# Patient Record
Sex: FEMALE | Race: WHITE | NOT HISPANIC OR LATINO | Employment: FULL TIME | URBAN - METROPOLITAN AREA
[De-identification: names, ages, dates, MRNs, and addresses within clinical notes are randomized per-mention and may not be internally consistent; named-entity substitution may affect disease eponyms.]

---

## 2017-11-30 ENCOUNTER — OFFICE VISIT (OUTPATIENT)
Dept: INTERNAL MEDICINE | Facility: CLINIC | Age: 22
End: 2017-11-30
Payer: COMMERCIAL

## 2017-11-30 VITALS
TEMPERATURE: 98.9 F | WEIGHT: 190.9 LBS | BODY MASS INDEX: 28.27 KG/M2 | SYSTOLIC BLOOD PRESSURE: 128 MMHG | HEIGHT: 69 IN | HEART RATE: 78 BPM | OXYGEN SATURATION: 98 % | DIASTOLIC BLOOD PRESSURE: 82 MMHG

## 2017-11-30 DIAGNOSIS — R19.5 LOOSE STOOLS: Primary | ICD-10-CM

## 2017-11-30 DIAGNOSIS — R19.7 DIARRHEA, UNSPECIFIED TYPE: ICD-10-CM

## 2017-11-30 LAB
ALBUMIN SERPL-MCNC: 4.1 G/DL (ref 3.4–5)
ALP SERPL-CCNC: 69 U/L (ref 40–150)
ALT SERPL W P-5'-P-CCNC: 21 U/L (ref 0–50)
ANION GAP SERPL CALCULATED.3IONS-SCNC: 4 MMOL/L (ref 3–14)
AST SERPL W P-5'-P-CCNC: 8 U/L (ref 0–45)
BASOPHILS # BLD AUTO: 0 10E9/L (ref 0–0.2)
BASOPHILS NFR BLD AUTO: 0.5 %
BILIRUB SERPL-MCNC: 0.4 MG/DL (ref 0.2–1.3)
BUN SERPL-MCNC: 8 MG/DL (ref 7–30)
CALCIUM SERPL-MCNC: 9.6 MG/DL (ref 8.5–10.1)
CHLORIDE SERPL-SCNC: 105 MMOL/L (ref 94–109)
CO2 SERPL-SCNC: 29 MMOL/L (ref 20–32)
CREAT SERPL-MCNC: 0.66 MG/DL (ref 0.52–1.04)
DIFFERENTIAL METHOD BLD: NORMAL
EOSINOPHIL # BLD AUTO: 0.1 10E9/L (ref 0–0.7)
EOSINOPHIL NFR BLD AUTO: 1.4 %
ERYTHROCYTE [DISTWIDTH] IN BLOOD BY AUTOMATED COUNT: 14.3 % (ref 10–15)
GFR SERPL CREATININE-BSD FRML MDRD: >90 ML/MIN/1.7M2
GLUCOSE SERPL-MCNC: 81 MG/DL (ref 70–99)
HCT VFR BLD AUTO: 39.1 % (ref 35–47)
HGB BLD-MCNC: 12.6 G/DL (ref 11.7–15.7)
LYMPHOCYTES # BLD AUTO: 2 10E9/L (ref 0.8–5.3)
LYMPHOCYTES NFR BLD AUTO: 31.5 %
MCH RBC QN AUTO: 27 PG (ref 26.5–33)
MCHC RBC AUTO-ENTMCNC: 32.2 G/DL (ref 31.5–36.5)
MCV RBC AUTO: 84 FL (ref 78–100)
MONOCYTES # BLD AUTO: 0.6 10E9/L (ref 0–1.3)
MONOCYTES NFR BLD AUTO: 8.8 %
NEUTROPHILS # BLD AUTO: 3.7 10E9/L (ref 1.6–8.3)
NEUTROPHILS NFR BLD AUTO: 57.8 %
PLATELET # BLD AUTO: 265 10E9/L (ref 150–450)
POTASSIUM SERPL-SCNC: 4 MMOL/L (ref 3.4–5.3)
PROT SERPL-MCNC: 8.1 G/DL (ref 6.8–8.8)
RBC # BLD AUTO: 4.66 10E12/L (ref 3.8–5.2)
SODIUM SERPL-SCNC: 138 MMOL/L (ref 133–144)
TSH SERPL DL<=0.005 MIU/L-ACNC: 0.69 MU/L (ref 0.4–4)
WBC # BLD AUTO: 6.5 10E9/L (ref 4–11)

## 2017-11-30 PROCEDURE — 99213 OFFICE O/P EST LOW 20 MIN: CPT | Performed by: PHYSICIAN ASSISTANT

## 2017-11-30 PROCEDURE — 80050 GENERAL HEALTH PANEL: CPT | Performed by: PHYSICIAN ASSISTANT

## 2017-11-30 PROCEDURE — 36415 COLL VENOUS BLD VENIPUNCTURE: CPT | Performed by: PHYSICIAN ASSISTANT

## 2017-11-30 NOTE — MR AVS SNAPSHOT
After Visit Summary   11/30/2017    Chante Nair    MRN: [id number]           Patient Information     Date Of Birth          1995        Visit Information        Provider Department      11/30/2017 3:00 PM Paula Skelton PA-C [facility]        Today's Diagnoses     Loose stools    -  1    Diarrhea, unspecified type          Care Instructions    May try Imodium AD over the counter once you have done the stool testing.             Follow-ups after your visit        Future tests that were ordered for you today     Open Future Orders        Priority Expected Expires Ordered    Enteric Bacteria and Virus Panel by NATIVIDAD Stool Routine  11/30/2018 11/30/2017            Who to contact     If you have questions or need follow up information about today's clinic visit or your schedule please contact [facility] directly at [phone].  Normal or non-critical lab and imaging results will be communicated to you by Texturahart, letter or phone within 4 business days after the clinic has received the results. If you do not hear from us within 7 days, please contact the clinic through Texturahart or phone. If you have a critical or abnormal lab result, we will notify you by phone as soon as possible.  Submit refill requests through Viralheat or call your pharmacy and they will forward the refill request to us. Please allow 3 business days for your refill to be completed.          Additional Information About Your Visit        MyChart Information     Viralheat gives you secure access to your electronic health record. If you see a primary care provider, you can also send messages to your care team and make appointments. If you have questions, please call your primary care clinic.  If you do not have a primary care provider, please call [phone] and they will assist you.        Care EveryWhere ID     This is your Care EveryWhere ID. This could be used by other  "organizations to access your [place] medical records  [id number]        Your Vitals Were     Pulse Temperature Height Last Period Pulse Oximetry BMI (Body Mass Index)    78 98.9  F (37.2  C) (Oral) 5' 9\" (1.753 m) 11/16/2017 98% 28.19 kg/m2       Blood Pressure from Last 3 Encounters:   11/30/17 128/82   12/27/16 126/78   10/24/16 110/64    Weight from Last 3 Encounters:   11/30/17 190 lb 14.4 oz (86.6 kg)   12/27/16 180 lb 9.6 oz (81.9 kg) (95 %)*   10/24/16 175 lb (79.4 kg) (93 %)*     * Growth percentiles are based on [facility] 2-20 Years data.              We Performed the Following     CBC with platelets differential     Comprehensive metabolic panel     TSH          Today's Medication Changes          These changes are accurate as of: 11/30/17  3:20 PM.  If you have any questions, ask your nurse or doctor.               Stop taking these medicines if you haven't already. Please contact your care team if you have questions.     medroxyPROGESTERone 150 MG/ML injection   Commonly known as:  DEPO-PROVERA   Stopped by:  Paula Skelton PA-C                    Primary Care Provider Office Phone # Fax #    Robert Navas [phone]       [address] [facility] [address]        Equal Access to Services     [facility] : Hadii nahed tipton hadcandyo Sonoam, waaxda luqadaha, qaybta kaalmada dyan, batsheva coronado . So [facility] [phone].    ATENCIÓN: Si habla español, tiene a clifton disposición servicios gratuitos de asistencia lingüística. Llame al [phone].    We comply with applicable federal civil rights laws and [place] laws. We do not discriminate on the basis of race, color, national origin, age, disability, sex, sexual orientation, or gender identity.            Thank you!     Thank you for choosing [facility]  for your care. Our goal is always to provide you with excellent care. Hearing back from our patients is one way we can continue to " improve our services. Please take a few minutes to complete the written survey that you may receive in the mail after your visit with us. Thank you!             Your Updated Medication List - Protect others around you: Learn how to safely use, store and throw away your medicines at www.disposemymeds.org.          This list is accurate as of: 11/30/17  3:20 PM.  Always use your most recent med list.                   Brand Name Dispense Instructions for use Diagnosis    amitriptyline 25 MG tablet    ELAVIL    90 tablet    Take 1 tablet (25 mg) by mouth At Bedtime    Other type of migraine       TYLENOL PO

## 2017-11-30 NOTE — NURSING NOTE
"Chief Complaint   Patient presents with     Diarrhea     x2 months        Initial /82 (BP Location: Left arm, Patient Position: Chair, Cuff Size: Adult Regular)  Pulse 78  Temp 98.9  F (37.2  C) (Oral)  Ht 5' 9\" (1.753 m)  Wt 190 lb 14.4 oz (86.6 kg)  LMP 11/16/2017  SpO2 98%  BMI 28.19 kg/m2 Estimated body mass index is 28.19 kg/(m^2) as calculated from the following:    Height as of this encounter: 5' 9\" (1.753 m).    Weight as of this encounter: 190 lb 14.4 oz (86.6 kg).  Medication Reconciliation: complete    "

## 2017-11-30 NOTE — PROGRESS NOTES
"  SUBJECTIVE:   Chante Nair is a 22 year old female who presents to clinic today for the following health issues:      Diarrhea  Onset: x2 months     Description:   Consistency of stool: runny loose stool   6 times   Blood in stool: no   Number of loose stools in past 24 hours: 3    Progression of Symptoms:  same    Accompanying Signs & Symptoms:  Fever: no   Nausea or vomiting; YES- nausea   Abdominal pain: no   Episodes of constipation: no   Weight loss: no     History:   Ill contacts: no   Recent use of antibiotics: no  - none before this started   Recent travels: no  -   No camping.         Recent medication-new or changes(Rx or OTC): none     Precipitating factors:   Food, Notes if she eats then will have loose stools or watery diarrhea after    Alleviating factors:   Na     Therapies Tried and outcome:  PeptoBismol; no relief - a few times  No change.   Active bowel sounds.         -------------------------------------    Problem list and histories reviewed & adjusted, as indicated.  Additional history: as documented    Labs reviewed in EPIC    Reviewed and updated as needed this visit by clinical staffTobacco  Allergies       Reviewed and updated as needed this visit by Provider  Allergies  Meds         ROS:  Constitutional, HEENT, cardiovascular, pulmonary, gi and gu systems are negative, except as otherwise noted.      OBJECTIVE:   /82 (BP Location: Left arm, Patient Position: Chair, Cuff Size: Adult Regular)  Pulse 78  Temp 98.9  F (37.2  C) (Oral)  Ht 5' 9\" (1.753 m)  Wt 190 lb 14.4 oz (86.6 kg)  [facility] 11/16/2017  SpO2 98%  BMI 28.19 kg/m2  Body mass index is 28.19 kg/(m^2).  GENERAL: healthy, alert and no distress  HENT: normal cephalic/atraumatic and oral mucous membranes moist  RESP: lungs clear to auscultation - no rales, rhonchi or wheezes  CV: regular rates and rhythm and normal S1 S2, no S3 or S4  ABDOMEN: soft, nontender, no hepatosplenomegaly, no masses and bowel sounds " normal  SKIN: no suspicious lesions or rashes  Psych- flat affect     Diagnostic Test Results:  Pending     ASSESSMENT/PLAN:             1. Loose stools    - CBC with platelets differential  - TSH  - Comprehensive metabolic panel  - Enteric Bacteria and Virus Panel by [facility] Stool; Future    2. Diarrhea, unspecified type    - Enteric Bacteria and Virus Panel by [facility] Stool; Future    Patient Instructions   May try Imodium AD over the counter once you have done the stool testing.         Reviewed symptoms, Will get some labs as this is ongoing x 2 months.   Would like to change PCP - info sheet given.     Recheck pending results        Paula Skelton PA-C  [facility]

## 2018-05-20 ENCOUNTER — HEALTH MAINTENANCE LETTER (OUTPATIENT)
Age: 23
End: 2018-05-20

## 2018-10-28 ENCOUNTER — HEALTH MAINTENANCE LETTER (OUTPATIENT)
Age: 23
End: 2018-10-28

## 2018-11-18 ENCOUNTER — HEALTH MAINTENANCE LETTER (OUTPATIENT)
Age: 23
End: 2018-11-18

## 2019-01-09 ENCOUNTER — OFFICE VISIT (OUTPATIENT)
Dept: URGENT CARE | Facility: URGENT CARE | Age: 24
End: 2019-01-09
Payer: COMMERCIAL

## 2019-01-09 VITALS
TEMPERATURE: 99.8 F | SYSTOLIC BLOOD PRESSURE: 118 MMHG | RESPIRATION RATE: 16 BRPM | DIASTOLIC BLOOD PRESSURE: 82 MMHG | HEART RATE: 118 BPM | OXYGEN SATURATION: 97 %

## 2019-01-09 DIAGNOSIS — R05.9 COUGH: ICD-10-CM

## 2019-01-09 DIAGNOSIS — J01.90 ACUTE SINUSITIS WITH COEXISTING CONDITION REQUIRING PROPHYLACTIC TREATMENT: Primary | ICD-10-CM

## 2019-01-09 PROCEDURE — 99214 OFFICE O/P EST MOD 30 MIN: CPT | Performed by: PHYSICIAN ASSISTANT

## 2019-01-09 RX ORDER — AZITHROMYCIN 200 MG/5ML
POWDER, FOR SUSPENSION ORAL
Qty: 40 ML | Refills: 0 | Status: SHIPPED | OUTPATIENT
Start: 2019-01-09 | End: 2021-04-01

## 2019-01-09 RX ORDER — CODEINE PHOSPHATE AND GUAIFENESIN 10; 100 MG/5ML; MG/5ML
1-2 SOLUTION ORAL EVERY 4 HOURS PRN
Qty: 120 ML | Refills: 0 | Status: SHIPPED | OUTPATIENT
Start: 2019-01-09 | End: 2021-04-01

## 2019-01-09 NOTE — LETTER
[place] [facility]  [address] [id number] [phone]      January 9, 2019    RE:  Chante Nair                                                                                                                                                       [address] [facility] [address]            To whom it may concern:    Chante Nair was seen in clinic today for illness.  She may return to work on Friday.          Sincerely,        Narcisa Hall    [place] [facility]

## 2019-01-10 NOTE — PATIENT INSTRUCTIONS
(J01.90) Acute sinusitis with coexisting condition requiring prophylactic treatment  (primary encounter diagnosis)  Comment:   Plan: azithromycin (ZITHROMAX) 200 MG/5ML suspension            (R05) Cough  Comment:   Plan: guaiFENesin-codeine (ROBITUSSIN AC) 100-10         MG/5ML solution            Rest.    Ibuprofen as needed for fever.      Follow up with primary clinic should symptoms persist or worsen.

## 2019-01-10 NOTE — PROGRESS NOTES
SUBJECTIVE:   Chante Nair is a 23 year old female presenting with a chief complaint of   1) sinus congestion for the past 15 days  2) productive cough for the past 4 days  3) fevers up to 101 over the past couple of days.    Last motrin was within the past 8 hours.    Low grade fever in clinic    No flu vaccination this season  Onset of symptoms was as above.  Course of illness is worsening.    Severity moderate  Current and Associated symptoms: as above  Treatment measures tried include Tylenol/Ibuprofen.  Predisposing factors include as above.    Past Medical History:   Diagnosis Date     Iron deficiency anemia, unspecified iron deficiency 2/10/2016     Migraines      Other type of migraine 12/25/2016     Patient Active Problem List   Diagnosis     Scoliosis     Iron deficiency anemia, unspecified iron deficiency     Other type of migraine     Social History     Tobacco Use     Smoking status: Never Smoker     Smokeless tobacco: Never Used   Substance Use Topics     Alcohol use: No     Alcohol/week: 0.0 oz       ROS:  CONSTITUTIONAL:as per HPI  INTEGUMENTARY/SKIN: NEGATIVE for worrisome rashes, moles or lesions  EYES: NEGATIVE for vision changes or irritation  ENT/MOUTH: as per HPI  RESP:as per HPI  CV: NEGATIVE for chest pain, palpitations or peripheral edema  : negative  MUSCULOSKELETAL: NEGATIVE for significant arthralgias or myalgia  NEURO: NEGATIVE for weakness, dizziness or paresthesias  Review of systems negative except as stated above.    OBJECTIVE  :/82   Pulse 118   Temp 99.8  F (37.7  C) (Oral)   Resp 16   SpO2 97%   GENERAL APPEARANCE: healthy, alert and no distress  EYES: EOMI,  PERRL, conjunctiva clear  HENT: ear canals and TM's normal.  Nose and mouth without ulcers, erythema or lesions  HENT: nasal turbinates erythematous, swollen and rhinorrhea yellow  NECK: supple, nontender, no lymphadenopathy  RESP: lungs clear to auscultation - no rales, rhonchi or wheezes  CV: regular rates and  rhythm, normal S1 S2, no murmur noted  ABDOMEN:  soft, nontender, no HSM or masses and bowel sounds normal  NEURO: Normal strength and tone, sensory exam grossly normal,  normal speech and mentation  SKIN: no suspicious lesions or rashes    (J01.90) Acute sinusitis with coexisting condition requiring prophylactic treatment  (primary encounter diagnosis)  Comment:   Plan: azithromycin (ZITHROMAX) 200 MG/5ML suspension            (R05) Cough  Comment:   Plan: guaiFENesin-codeine (ROBITUSSIN AC) 100-10         MG/5ML solution            Rest.    Ibuprofen as needed for fever.      Follow up with primary clinic should symptoms persist or worsen.      Use codeine cough syrup with caution.

## 2020-02-10 ENCOUNTER — HEALTH MAINTENANCE LETTER (OUTPATIENT)
Age: 25
End: 2020-02-10

## 2021-04-01 ENCOUNTER — OFFICE VISIT (OUTPATIENT)
Dept: INTERNAL MEDICINE | Facility: CLINIC | Age: 26
End: 2021-04-01
Payer: COMMERCIAL

## 2021-04-01 VITALS
DIASTOLIC BLOOD PRESSURE: 70 MMHG | WEIGHT: 204 LBS | BODY MASS INDEX: 30.13 KG/M2 | HEART RATE: 80 BPM | OXYGEN SATURATION: 100 % | SYSTOLIC BLOOD PRESSURE: 122 MMHG | TEMPERATURE: 99 F

## 2021-04-01 DIAGNOSIS — D50.0 IRON DEFICIENCY ANEMIA DUE TO CHRONIC BLOOD LOSS: Primary | ICD-10-CM

## 2021-04-01 DIAGNOSIS — R42 LIGHTHEADEDNESS: ICD-10-CM

## 2021-04-01 LAB
BASOPHILS # BLD AUTO: 0 10E9/L (ref 0–0.2)
BASOPHILS NFR BLD AUTO: 0.2 %
DIFFERENTIAL METHOD BLD: ABNORMAL
EOSINOPHIL # BLD AUTO: 0 10E9/L (ref 0–0.7)
EOSINOPHIL NFR BLD AUTO: 0.4 %
ERYTHROCYTE [DISTWIDTH] IN BLOOD BY AUTOMATED COUNT: 14.6 % (ref 10–15)
FERRITIN SERPL-MCNC: 10 NG/ML (ref 12–150)
HCT VFR BLD AUTO: 38.5 % (ref 35–47)
HGB BLD-MCNC: 12.1 G/DL (ref 11.7–15.7)
LYMPHOCYTES # BLD AUTO: 1.9 10E9/L (ref 0.8–5.3)
LYMPHOCYTES NFR BLD AUTO: 23.3 %
MCH RBC QN AUTO: 26 PG (ref 26.5–33)
MCHC RBC AUTO-ENTMCNC: 31.4 G/DL (ref 31.5–36.5)
MCV RBC AUTO: 83 FL (ref 78–100)
MONOCYTES # BLD AUTO: 0.5 10E9/L (ref 0–1.3)
MONOCYTES NFR BLD AUTO: 5.7 %
NEUTROPHILS # BLD AUTO: 5.7 10E9/L (ref 1.6–8.3)
NEUTROPHILS NFR BLD AUTO: 70.4 %
PLATELET # BLD AUTO: 291 10E9/L (ref 150–450)
RBC # BLD AUTO: 4.66 10E12/L (ref 3.8–5.2)
TSH SERPL DL<=0.005 MIU/L-ACNC: 1.28 MU/L (ref 0.4–4)
WBC # BLD AUTO: 8.1 10E9/L (ref 4–11)

## 2021-04-01 PROCEDURE — 82728 ASSAY OF FERRITIN: CPT | Performed by: INTERNAL MEDICINE

## 2021-04-01 PROCEDURE — 99213 OFFICE O/P EST LOW 20 MIN: CPT | Performed by: INTERNAL MEDICINE

## 2021-04-01 PROCEDURE — 36415 COLL VENOUS BLD VENIPUNCTURE: CPT | Performed by: INTERNAL MEDICINE

## 2021-04-01 PROCEDURE — 85025 COMPLETE CBC W/AUTO DIFF WBC: CPT | Performed by: INTERNAL MEDICINE

## 2021-04-01 PROCEDURE — 84443 ASSAY THYROID STIM HORMONE: CPT | Performed by: INTERNAL MEDICINE

## 2021-04-01 NOTE — PATIENT INSTRUCTIONS
I will let you know your lab results.       Then we will make a decision regarding intravenous versus oral iron.

## 2021-04-01 NOTE — PROGRESS NOTES
Assessment & Plan     Iron deficiency anemia due to chronic blood loss  Due to menstrual blood loss.  - Ferritin  - CBC with platelets and differential  - TSH with free T4 reflex    Lightheadedness  Multifactorial  - TSH with free T4 reflex               Patient Instructions   I will let you know your lab results.       Then we will make a decision regarding intravenous versus oral iron.      Return in about 3 months (around 7/1/2021) for follow up of several issues, labs will be needed.    Henry Lundberg MD  [facility] MAYRA Sharif is a 26 year old who presents for the following health issues     HPI     Concern - fatigue  Onset: couple of months  Description: lighheadedness, some heart palpitations   Intensity: moderate  Progression of Symptoms:  worsening  Accompanying Signs & Symptoms: some migraines  Previous history of similar problem: YES - has had low iron in the past  Precipitating factors:        Worsened by: none  Alleviating factors:        Improved by: none  Therapies tried and outcome:  none                 This patient is here with a variety of issues.              She has a previous history of severe iron deficiency anemia.  4 years ago, her hemoglobin was 8 and her ferritin was 2.           She has trouble swallowing iron tablets.      She received an iron infusion at that time.  She took some natures plus chewable iron with vitamin C.   These contain 27 mg of iron per tablet.            Follow-up labs showed hgb 12.6 in 11/17; ferritin 24 in 8/16.             No recent labs in Epic.  She has not had really any significant medical care for over 3 years now.      She rarely has taken any of the natures plus iron over the past couple of years.  She did start up again a few days ago.         She believes she is very likely iron deficient again.  Her menstrual periods are heavy, that pattern has not changed.                  She has mild dyspnea on  exertion, and some lightheadedness.                    She works full-time in retail.         During the interview, she is tearful at times.        She is still grieving the death of her father 6 months ago.   She was living with her parents.  She discovered her dead father at home of natural causes at age 54.     She has not had any grief counseling.           She was seeing a counselor over 1 year ago dealing with various phobias, and some avoidance therapy was being planned, but that was halted by the Covid pandemic.  She has not wanted to resume therapy in person due to Covid concerns, and she does not want to do video visits.                                                    she has a variety of other somatic complaints.  She states she likely has irritable bowel syndrome.     Celiac antibodies were negative 4 years ago.             She states she does not believe that she is depressed.  Review of Systems         Objective    /70   Pulse 80   Temp 99  F (37.2  C) (Oral)   Wt 92.5 kg (204 lb)   SpO2 100%   BMI 30.13 kg/m    Body mass index is 30.13 kg/m .  Physical Exam   GENERAL APPEARANCE: alert  NECK: no adenopathy, no asymmetry, masses, or scars and thyroid normal to palpation  RESP: lungs clear to auscultation - no rales, rhonchi or wheezes  CV: regular rates and rhythm, normal S1 S2, no S3 or S4 and no murmur, click or rub  PSYCH: anxious, fatigued and intermittently tearful                 Addendum:             Lab results returned after she left.  Results for orders placed or performed in visit on 04/01/21 (from the past 24 hour(s))   Ferritin   Result Value Ref Range    Ferritin 10 (L) 12 - 150 ng/mL   CBC with platelets and differential   Result Value Ref Range    WBC 8.1 4.0 - 11.0 10e9/L    RBC Count 4.66 3.8 - 5.2 10e12/L    Hemoglobin 12.1 11.7 - 15.7 g/dL    Hematocrit 38.5 35.0 - 47.0 %    MCV 83 78 - 100 fl    MCH 26.0 (L) 26.5 - 33.0 pg    MCHC 31.4 (L) 31.5 - 36.5 g/dL    RDW 14.6  10.0 - 15.0 %    Platelet Count 291 150 - 450 10e9/L    % Neutrophils 70.4 %    % Lymphocytes 23.3 %    % Monocytes 5.7 %    % Eosinophils 0.4 %    % Basophils 0.2 %    Absolute Neutrophil 5.7 1.6 - 8.3 10e9/L    Absolute Lymphocytes 1.9 0.8 - 5.3 10e9/L    Absolute Monocytes 0.5 0.0 - 1.3 10e9/L    Absolute Eosinophils 0.0 0.0 - 0.7 10e9/L    Absolute Basophils 0.0 0.0 - 0.2 10e9/L    Diff Method Automated Method    TSH with free T4 reflex   Result Value Ref Range    TSH 1.28 0.40 - 4.00 mU/L       My chart message sent.  Letter sent also.    She just signed up with my chart, and she is not sure if she will be able to access her lab results.                                                                                                                                                                                                     Your iron level (ferritin) is low at 10, but you have had enough iron to get your hemoglobin up to a reasonable level of 12.                     If you just take one of your iron tablets every day going forward, that may be enough.      I do not recommend intravenous iron for you at this time.                  The thyroid level is normal.                     We should recheck some of these labs again in 3 months.              See you then.

## 2021-04-01 NOTE — LETTER
April 1, 2021      Chante Nair  [address] [facility] [id number]        Dear ,    We are writing to inform you of your test results.                 Your iron level (ferritin) is low at 10, but you have had enough iron to get your hemoglobin up to a reasonable level of 12.                     If you just take one of your iron tablets every day going forward, that may be enough.      I do not recommend intravenous iron for you at this time.                  The thyroid level is normal.                     We should recheck some of these labs again in 3 months.              See you then.        Results for orders placed or performed in visit on 04/01/21   Ferritin     Status: Abnormal   Result Value Ref Range    Ferritin 10 (L) 12 - 150 ng/mL   CBC with platelets and differential     Status: Abnormal   Result Value Ref Range    WBC 8.1 4.0 - 11.0 10e9/L    RBC Count 4.66 3.8 - 5.2 10e12/L    Hemoglobin 12.1 11.7 - 15.7 g/dL    Hematocrit 38.5 35.0 - 47.0 %    MCV 83 78 - 100 fl    MCH 26.0 (L) 26.5 - 33.0 pg    MCHC 31.4 (L) 31.5 - 36.5 g/dL    RDW 14.6 10.0 - 15.0 %    Platelet Count 291 150 - 450 10e9/L    % Neutrophils 70.4 %    % Lymphocytes 23.3 %    % Monocytes 5.7 %    % Eosinophils 0.4 %    % Basophils 0.2 %    Absolute Neutrophil 5.7 1.6 - 8.3 10e9/L    Absolute Lymphocytes 1.9 0.8 - 5.3 10e9/L    Absolute Monocytes 0.5 0.0 - 1.3 10e9/L    Absolute Eosinophils 0.0 0.0 - 0.7 10e9/L    Absolute Basophils 0.0 0.0 - 0.2 10e9/L    Diff Method Automated Method    TSH with free T4 reflex     Status: None   Result Value Ref Range    TSH 1.28 0.40 - 4.00 mU/L         If you have any questions or concerns, please call the clinic at the number listed above.       Sincerely,      Henry Lundberg MD

## 2021-04-04 ENCOUNTER — HEALTH MAINTENANCE LETTER (OUTPATIENT)
Age: 26
End: 2021-04-04

## 2021-06-05 ENCOUNTER — NURSE TRIAGE (OUTPATIENT)
Dept: NURSING | Facility: CLINIC | Age: 26
End: 2021-06-05

## 2021-06-05 NOTE — TELEPHONE ENCOUNTER
Patient is calling and says she was walking to work and a squirrel fell from the tree and landed on her neck. Patient says she sustained a scratch from the squirrel and does not believe it was from a bite.  Triage guidelines recommend to go to ED. Caller verbalized and understands directives.  COVID 19 Nurse Triage Plan/Patient Instructions    Please be aware that novel coronavirus (COVID-19) may be circulating in the community. If you develop symptoms such as fever, cough, or SOB or if you have concerns about the presence of another infection including coronavirus (COVID-19), please contact your health care provider or visit [email or web address].     Disposition/Instructions    ED Visit recommended. Follow protocol based instructions.     Bring Your Own Device:  Please also bring your smart device(s) (smart phones, tablets, laptops) and their charging cables for your personal use and to communicate with your care team during your visit.    Thank you for taking steps to prevent the spread of this virus.  o Limit your contact with others.  o Wear a simple mask to cover your cough.  o Wash your hands well and often.    Resources    [facility]: About COVID-19: [email or web address]    CDC: What to Do If You're Sick: www.cdc.gov/coronavirus/2019-ncov/about/steps-when-sick.html    CDC: Ending Home Isolation: www.cdc.gov/coronavirus/2019-ncov/hcp/disposition-in-home-patients.html     CDC: Caring for Someone: www.cdc.gov/coronavirus/2019-ncov/if-you-are-sick/care-for-someone.html     [facility]: Interim Guidance for Hospital Discharge to Home: www.health.state.[place]/diseases/coronavirus/hcp/hospdischarge.pdf    [facility] clinical trials (COVID-19 research studies): [email or web address]     Below are the COVID-19 hotlines at the [place] Department of Health ([facility]). Interpreters are available.   o For health questions: Call 602-700-3553 or 1-359.631.5193 (7 a.m. to 7  p.m.)  o For questions about schools and childcare: Call 213-787-6822 or 1-602.602.6292 (7 a.m. to 7 p.m.)                     Reason for Disposition    [1] Animal bite AND [2] broken skin    [1] Any break in skin from BITE (e.g., cut, puncture or scratch) AND[2] WILD animal at risk for RABIES (e.g., bat, raccoon, arevalo, skunk, coyote, other carnivores)    Additional Information    Negative: [1] Major bleeding (e.g., actively dripping or spurting) AND [2] can't be stopped    Negative: Amputation    Negative: Shock suspected (e.g., cold/pale/clammy skin, too weak to stand, low BP, rapid pulse)    Negative: [1] Knife wound (or other possibly deep cut) AND [2] to chest, abdomen, back, neck, or head    Negative: [1] Cutter (self-mutilator) AND [2] suicidal or out-of-control    Negative: Sounds like a life-threatening emergency to the triager    Negative: [1] Major bleeding (e.g., actively dripping or spurting) AND [2] can't be stopped    Negative: Sounds like a life-threatening emergency to the triager    Negative: Snake bite    Negative: Bite, wound, or sting from fish    Protocols used: CUTS AND [id number], ANIMAL BITE-A-AH

## 2022-04-30 ENCOUNTER — HEALTH MAINTENANCE LETTER (OUTPATIENT)
Age: 27
End: 2022-04-30

## 2022-06-20 ENCOUNTER — APPOINTMENT (OUTPATIENT)
Dept: CT IMAGING | Facility: CLINIC | Age: 27
End: 2022-06-20
Attending: EMERGENCY MEDICINE

## 2022-06-20 ENCOUNTER — HOSPITAL ENCOUNTER (EMERGENCY)
Facility: CLINIC | Age: 27
Discharge: HOME | End: 2022-06-20
Attending: EMERGENCY MEDICINE | Admitting: EMERGENCY MEDICINE

## 2022-06-20 VITALS
OXYGEN SATURATION: 100 % | HEART RATE: 97 BPM | SYSTOLIC BLOOD PRESSURE: 115 MMHG | RESPIRATION RATE: 13 BRPM | DIASTOLIC BLOOD PRESSURE: 75 MMHG

## 2022-06-20 DIAGNOSIS — N20.0 KIDNEY STONE: ICD-10-CM

## 2022-06-20 DIAGNOSIS — F41.1 ANXIETY REACTION: ICD-10-CM

## 2022-06-20 LAB
ALBUMIN SERPL-MCNC: 4.2 G/DL (ref 3.4–5)
ALBUMIN UR-MCNC: NEGATIVE MG/DL
ALP SERPL-CCNC: 79 U/L (ref 40–150)
ALT SERPL W P-5'-P-CCNC: 26 U/L (ref 0–50)
ANION GAP SERPL CALCULATED.3IONS-SCNC: 9 MMOL/L (ref 3–14)
APPEARANCE UR: CLEAR
AST SERPL W P-5'-P-CCNC: 10 U/L (ref 0–45)
B-HCG SERPL-ACNC: <1 IU/L (ref 0–5)
BASOPHILS # BLD AUTO: 0.1 10E3/UL (ref 0–0.2)
BASOPHILS NFR BLD AUTO: 1 %
BILIRUB SERPL-MCNC: 0.4 MG/DL (ref 0.2–1.3)
BILIRUB UR QL STRIP: NEGATIVE
BUN SERPL-MCNC: 8 MG/DL (ref 7–30)
CALCIUM SERPL-MCNC: 10.2 MG/DL (ref 8.5–10.1)
CHLORIDE BLD-SCNC: 104 MMOL/L (ref 94–109)
CO2 SERPL-SCNC: 21 MMOL/L (ref 20–32)
COLOR UR AUTO: ABNORMAL
CREAT SERPL-MCNC: 0.68 MG/DL (ref 0.52–1.04)
EOSINOPHIL # BLD AUTO: 0 10E3/UL (ref 0–0.7)
EOSINOPHIL NFR BLD AUTO: 0 %
ERYTHROCYTE [DISTWIDTH] IN BLOOD BY AUTOMATED COUNT: 14.5 % (ref 10–15)
GFR SERPL CREATININE-BSD FRML MDRD: >90 ML/MIN/1.73M2
GLUCOSE BLD-MCNC: 153 MG/DL (ref 70–99)
GLUCOSE UR STRIP-MCNC: NEGATIVE MG/DL
HCT VFR BLD AUTO: 40.9 % (ref 35–47)
HGB BLD-MCNC: 13 G/DL (ref 11.7–15.7)
HGB UR QL STRIP: ABNORMAL
HOLD SPECIMEN: NORMAL
IMM GRANULOCYTES # BLD: 0.1 10E3/UL
IMM GRANULOCYTES NFR BLD: 1 %
KETONES UR STRIP-MCNC: 10 MG/DL
LEUKOCYTE ESTERASE UR QL STRIP: NEGATIVE
LYMPHOCYTES # BLD AUTO: 2.9 10E3/UL (ref 0.8–5.3)
LYMPHOCYTES NFR BLD AUTO: 18 %
MCH RBC QN AUTO: 25.6 PG (ref 26.5–33)
MCHC RBC AUTO-ENTMCNC: 31.8 G/DL (ref 31.5–36.5)
MCV RBC AUTO: 81 FL (ref 78–100)
MONOCYTES # BLD AUTO: 0.7 10E3/UL (ref 0–1.3)
MONOCYTES NFR BLD AUTO: 4 %
NEUTROPHILS # BLD AUTO: 12 10E3/UL (ref 1.6–8.3)
NEUTROPHILS NFR BLD AUTO: 76 %
NITRATE UR QL: NEGATIVE
NRBC # BLD AUTO: 0 10E3/UL
NRBC BLD AUTO-RTO: 0 /100
PH UR STRIP: 7.5 [PH] (ref 5–7)
PLATELET # BLD AUTO: 442 10E3/UL (ref 150–450)
POTASSIUM BLD-SCNC: 4.3 MMOL/L (ref 3.4–5.3)
PROT SERPL-MCNC: 8.2 G/DL (ref 6.8–8.8)
RBC # BLD AUTO: 5.08 10E6/UL (ref 3.8–5.2)
RBC URINE: 27 /HPF
SODIUM SERPL-SCNC: 134 MMOL/L (ref 133–144)
SP GR UR STRIP: 1.02 (ref 1–1.03)
SQUAMOUS EPITHELIAL: <1 /HPF
UROBILINOGEN UR STRIP-MCNC: NORMAL MG/DL
WBC # BLD AUTO: 15.9 10E3/UL (ref 4–11)
WBC URINE: 2 /HPF

## 2022-06-20 PROCEDURE — 85025 COMPLETE CBC W/AUTO DIFF WBC: CPT | Performed by: EMERGENCY MEDICINE

## 2022-06-20 PROCEDURE — 96361 HYDRATE IV INFUSION ADD-ON: CPT

## 2022-06-20 PROCEDURE — 36415 COLL VENOUS BLD VENIPUNCTURE: CPT | Performed by: EMERGENCY MEDICINE

## 2022-06-20 PROCEDURE — 250N000011 HC RX IP 250 OP 636: Performed by: EMERGENCY MEDICINE

## 2022-06-20 PROCEDURE — 81001 URINALYSIS AUTO W/SCOPE: CPT | Performed by: EMERGENCY MEDICINE

## 2022-06-20 PROCEDURE — 250N000011 HC RX IP 250 OP 636

## 2022-06-20 PROCEDURE — 74177 CT ABD & PELVIS W/CONTRAST: CPT

## 2022-06-20 PROCEDURE — 99285 EMERGENCY DEPT VISIT HI MDM: CPT | Mod: 25

## 2022-06-20 PROCEDURE — 258N000003 HC RX IP 258 OP 636: Performed by: EMERGENCY MEDICINE

## 2022-06-20 PROCEDURE — 80053 COMPREHEN METABOLIC PANEL: CPT | Performed by: EMERGENCY MEDICINE

## 2022-06-20 PROCEDURE — 250N000009 HC RX 250: Performed by: EMERGENCY MEDICINE

## 2022-06-20 PROCEDURE — 96374 THER/PROPH/DIAG INJ IV PUSH: CPT | Mod: 59

## 2022-06-20 PROCEDURE — 96376 TX/PRO/DX INJ SAME DRUG ADON: CPT

## 2022-06-20 PROCEDURE — 84702 CHORIONIC GONADOTROPIN TEST: CPT | Performed by: EMERGENCY MEDICINE

## 2022-06-20 PROCEDURE — 96375 TX/PRO/DX INJ NEW DRUG ADDON: CPT

## 2022-06-20 RX ORDER — ONDANSETRON 4 MG/1
4 TABLET, ORALLY DISINTEGRATING ORAL EVERY 8 HOURS PRN
Qty: 10 TABLET | Refills: 0 | Status: SHIPPED | OUTPATIENT
Start: 2022-06-20 | End: 2022-06-23

## 2022-06-20 RX ORDER — HYDROCODONE BITARTRATE AND ACETAMINOPHEN 5; 325 MG/1; MG/1
1 TABLET ORAL EVERY 6 HOURS PRN
Qty: 12 TABLET | Refills: 0 | Status: SHIPPED | OUTPATIENT
Start: 2022-06-20 | End: 2022-06-23

## 2022-06-20 RX ORDER — LORAZEPAM 2 MG/ML
INJECTION INTRAMUSCULAR
Status: COMPLETED
Start: 2022-06-20 | End: 2022-06-20

## 2022-06-20 RX ORDER — LORAZEPAM 2 MG/ML
2 INJECTION INTRAMUSCULAR ONCE
Status: DISCONTINUED | OUTPATIENT
Start: 2022-06-20 | End: 2022-06-20 | Stop reason: HOSPADM

## 2022-06-20 RX ORDER — IBUPROFEN 600 MG/1
600 TABLET, FILM COATED ORAL EVERY 8 HOURS PRN
Qty: 30 TABLET | Refills: 0 | Status: SHIPPED | OUTPATIENT
Start: 2022-06-20 | End: 2022-06-30

## 2022-06-20 RX ORDER — IOPAMIDOL 755 MG/ML
103 INJECTION, SOLUTION INTRAVASCULAR ONCE
Status: COMPLETED | OUTPATIENT
Start: 2022-06-20 | End: 2022-06-20

## 2022-06-20 RX ORDER — LORAZEPAM 2 MG/ML
1 INJECTION INTRAMUSCULAR ONCE
Status: COMPLETED | OUTPATIENT
Start: 2022-06-20 | End: 2022-06-20

## 2022-06-20 RX ORDER — ONDANSETRON 2 MG/ML
4 INJECTION INTRAMUSCULAR; INTRAVENOUS EVERY 30 MIN PRN
Status: DISCONTINUED | OUTPATIENT
Start: 2022-06-20 | End: 2022-06-20 | Stop reason: HOSPADM

## 2022-06-20 RX ORDER — SODIUM CHLORIDE 9 MG/ML
INJECTION, SOLUTION INTRAVENOUS CONTINUOUS
Status: DISCONTINUED | OUTPATIENT
Start: 2022-06-20 | End: 2022-06-20 | Stop reason: HOSPADM

## 2022-06-20 RX ORDER — HYDROCODONE BITARTRATE AND ACETAMINOPHEN 5; 325 MG/1; MG/1
2 TABLET ORAL ONCE
Status: DISCONTINUED | OUTPATIENT
Start: 2022-06-20 | End: 2022-06-20 | Stop reason: HOSPADM

## 2022-06-20 RX ORDER — KETOROLAC TROMETHAMINE 15 MG/ML
15 INJECTION, SOLUTION INTRAMUSCULAR; INTRAVENOUS ONCE
Status: COMPLETED | OUTPATIENT
Start: 2022-06-20 | End: 2022-06-20

## 2022-06-20 RX ADMIN — SODIUM CHLORIDE 1000 ML: 9 INJECTION, SOLUTION INTRAVENOUS at 10:07

## 2022-06-20 RX ADMIN — KETOROLAC TROMETHAMINE 15 MG: 15 INJECTION, SOLUTION INTRAMUSCULAR; INTRAVENOUS at 11:35

## 2022-06-20 RX ADMIN — SODIUM CHLORIDE 69 ML: 900 INJECTION INTRAVENOUS at 10:49

## 2022-06-20 RX ADMIN — LORAZEPAM 1 MG: 2 INJECTION INTRAMUSCULAR at 09:56

## 2022-06-20 RX ADMIN — IOPAMIDOL 103 ML: 755 INJECTION, SOLUTION INTRAVENOUS at 10:49

## 2022-06-20 RX ADMIN — LORAZEPAM 1 MG: 2 INJECTION INTRAMUSCULAR; INTRAVENOUS at 09:56

## 2022-06-20 RX ADMIN — ONDANSETRON 4 MG: 2 INJECTION INTRAMUSCULAR; INTRAVENOUS at 11:35

## 2022-06-20 ASSESSMENT — ENCOUNTER SYMPTOMS
NAUSEA: 0
VOMITING: 0
DYSURIA: 0
BACK PAIN: 1
ABDOMINAL PAIN: 0
DIARRHEA: 0
NERVOUS/ANXIOUS: 1

## 2022-06-20 NOTE — DISCHARGE INSTRUCTIONS
As we discussed, please come back to the ER immediately if you have any vomiting, fever, or if your pain is no longer able to be managed by your medication.  Please go see your regular doctor if you do not have any pain, and be sure to see your regular doctor the next week to go over today's visit.  If you still do have a mild amount of pain however, but it is controlled with your medication, do see a urologist, whose number has been attached here, in the next 7 days as well.  Come back with any other concerns you have, or new symptoms of any kind of

## 2022-06-20 NOTE — ED TRIAGE NOTES
Pt brought right back to stab by triage nurse - triage helped pt out of car unable to stand not answering question - awoke this morning with left lower back pain with numbness tingling down legs with marvin hands cramping with pt hyperventilating   Pt complaining of severe back pain denies any trauma

## 2023-06-01 ENCOUNTER — HEALTH MAINTENANCE LETTER (OUTPATIENT)
Age: 28
End: 2023-06-01

## 2023-12-24 NOTE — PROGRESS NOTES
"  SUBJECTIVE:                                                   Chante Nair is a 28 year old who presents to clinic today for the following health issue(s):  Patient presents with:  Consult: Heavy periods, last period was 3 weeks and this period is also heavy and passing big clots       HPI:  Here to discuss heavy bleeding. Has always had heavy menses since menarche, cycles typically every 30 days, lasting 5-7 days and heavy- changing large pad every 2 hours or so and passing small clots for the first few days. For the past year, cycles are now longer, every 45-50 days, lasting 5-7 days and again passing clots. Her menses in November and December (currently on day 9 of menses) are been abnormally long and heavy. Menses in November lasted almost 3 weeks and was \"very heavy\" passing golf ball sized clots and changing a large pad every hour. Currently on Day 9 of her period, again changing large pad every hour and passing large clots. Think that's bleeding may be slowing down as she is now only passing dime size clots. Not necessarily having a lot of cramping. Denies vaginitis symptoms. Also has hx iron deficiency anemia d/t heavy menses     Was on COCs years ago and stopped d/t restless legs then tried depo but stopped due to worsening migraines.     Has never had any sort of work up (no labs or pelvic US). Knows that hormonal contraception is first line treatment for heavy menses. Not good at taking pills. Absolutely does not want IUD     Has never had a pap    Quite tearful in office today as she is anxious about physical exam, labs and necessary follow-up. Also does not have insurance so not sure how she is going to pay for visits. States she knows she needs to come back for follow-up but usually doesn't follow-up. Open to Care Coordination referral     Patient's last menstrual period was 2023.  Menstrual History: irregular   Patient is not sexually active  .  Using not sexually active for " contraception.   Health maintenance updated:  yes  STI infx testing offered:  Declined    Last PHQ-9 score on record =       2023    11:17 AM   PHQ-9 SCORE   PHQ-9 Total Score 6     Last GAD7 score on record =       2023    11:17 AM   CHEMA-7 SCORE   Total Score 10         Problem list and histories reviewed & adjusted, as indicated.  Additional history: as documented.    Patient Active Problem List   Diagnosis    Scoliosis    Iron deficiency anemia, unspecified iron deficiency    Other type of migraine     Past Surgical History:   Procedure Laterality Date    NO HISTORY OF SURGERY        Social History     Tobacco Use    Smoking status: Never    Smokeless tobacco: Never   Substance Use Topics    Alcohol use: No     Alcohol/week: 0.0 standard drinks of alcohol      Problem (# of Occurrences) Relation (Name,Age of Onset)    Cancer (1) Paternal Grandfather:  of leukemia ~ age 33    Diabetes (2) Mother: Gestational only, Father    Hypertension (1) Father    Lipids (2) Father, Paternal Grandmother    Obesity (1) Father    Prostate Cancer (1) Maternal Uncle    C.A.D. (3) Maternal Grandmother: MI in 70s, Paternal Grandmother: MI in mid 40s, Paternal Grandfather: MI in 30s; then dx with leukemia              Current Outpatient Medications   Medication Sig    ferrous fumarate 65 mg, [place]. FE,-Vitamin C 125 mg (VITRON C)  MG TABS tablet Take 1 tablet by mouth daily    norethindrone-ethinyl estradiol (MICROGESTIN 1.5/30) 1.5-30 MG-MCG tablet Take 1 tablet by mouth daily    ondansetron (ZOFRAN ODT) 4 MG ODT tab Take 1 tablet (4 mg) by mouth every 8 hours as needed for nausea     No current facility-administered medications for this visit.     Allergies   Allergen Reactions    Egg Yolk Nausea and Nausea and Vomiting       ROS:  12 point review of systems negative other than symptoms noted below or in the HPI.    OBJECTIVE:     /74   Wt 109.8 kg (242 lb)   LMP 2023   BMI 35.74 kg/m    Body mass  index is 35.74 kg/m .    PHYSICAL EXAM:  Constitutional:  Appearance: Well nourished, well developed alert, in no acute distress  Chest:  Respiratory Effort:  Breathing unlabored  PELVIC EXAM:  Vulva: No external lesions, BUS WNL  Vagina: Moist, pink, large amount of blood pooling in vaginal vault, well rugated, no lesions  Unable to complete pelvic exam due to patient discomfort    In-Clinic Test Results:  Results for orders placed or performed in visit on 12/28/23 (from the past 24 hour(s))   Testosterone Free and Total    Narrative    The following orders were created for panel order Testosterone Free and Total.  Procedure                               Abnormality         Status                     ---------                               -----------         ------                     Sex Hormone Binding Glob...[[id number]]                      In process                 Testosterone Free and Total[[id number]]                      In process                   Please view results for these tests on the individual orders.       ASSESSMENT/PLAN:                                                        ICD-10-CM    1. Menorrhagia with irregular cycle  N92.1 CBC with platelets     TSH with free T4 reflex     US Transvaginal Pelvic Non-OB     Testosterone Free and Total     Androstenedione     Dehydroepiandrosterone     Prolactin     CBC with platelets     TSH with free T4 reflex     Testosterone Free and Total     Androstenedione     Dehydroepiandrosterone     Prolactin     norethindrone-ethinyl estradiol (MICROGESTIN 1.5/30) 1.5-30 MG-MCG tablet      2. Does not have health insurance  Z59.89 Primary Care - Care Coordination Referral      3. Nausea  R11.0 ondansetron (ZOFRAN ODT) 4 MG ODT tab          COUNSELING:  -Long discussion with Chante about etiologies of heavy menses with irregular cycle. Necessary that we do lab work today and have her return for pelvic US. Discussed unable to assess for any uterine abnormality  today on exam due to client discomfort. Unable to collect pap as well   -Discussed she is still having a fair amount of bleeding for Day 9 of menses and I would recommend starting KRISSY. Discussed with OB on call Dr. Mcgraw as well who recommends starting a KRISSY today, if bleeding is not improved/stopped by Saturday she should start taking 2 COCs daily until bleeding is stopped then go back to taking 1 pill daily, following the pill pack. Chante is agreeable to this plan. Also sent rx for Zofran prn for nausea  -Care Coordination referral placed d/t lack of insurance  -RTC for pelvis US and provider appt to further discuss heavy menses treatment      ELIEL Reyes, ELSAM

## 2023-12-28 ENCOUNTER — OFFICE VISIT (OUTPATIENT)
Dept: MIDWIFE SERVICES | Facility: CLINIC | Age: 28
End: 2023-12-28

## 2023-12-28 VITALS — WEIGHT: 242 LBS | SYSTOLIC BLOOD PRESSURE: 122 MMHG | BODY MASS INDEX: 35.74 KG/M2 | DIASTOLIC BLOOD PRESSURE: 74 MMHG

## 2023-12-28 DIAGNOSIS — Z59.71 DOES NOT HAVE HEALTH INSURANCE: ICD-10-CM

## 2023-12-28 DIAGNOSIS — N92.1 MENORRHAGIA WITH IRREGULAR CYCLE: Primary | ICD-10-CM

## 2023-12-28 DIAGNOSIS — R11.0 NAUSEA: ICD-10-CM

## 2023-12-28 LAB
ERYTHROCYTE [DISTWIDTH] IN BLOOD BY AUTOMATED COUNT: 14.9 % (ref 10–15)
HCT VFR BLD AUTO: 31.9 % (ref 35–47)
HGB BLD-MCNC: 10.2 G/DL (ref 11.7–15.7)
MCH RBC QN AUTO: 25.8 PG (ref 26.5–33)
MCHC RBC AUTO-ENTMCNC: 32 G/DL (ref 31.5–36.5)
MCV RBC AUTO: 81 FL (ref 78–100)
PLATELET # BLD AUTO: 374 10E3/UL (ref 150–450)
PROLACTIN SERPL 3RD IS-MCNC: 5 NG/ML (ref 5–23)
RBC # BLD AUTO: 3.95 10E6/UL (ref 3.8–5.2)
SHBG SERPL-SCNC: 26 NMOL/L (ref 30–135)
TSH SERPL DL<=0.005 MIU/L-ACNC: 1.01 UIU/ML (ref 0.3–4.2)
WBC # BLD AUTO: 9.4 10E3/UL (ref 4–11)

## 2023-12-28 PROCEDURE — 85027 COMPLETE CBC AUTOMATED: CPT | Performed by: ADVANCED PRACTICE MIDWIFE

## 2023-12-28 PROCEDURE — 84403 ASSAY OF TOTAL TESTOSTERONE: CPT | Performed by: ADVANCED PRACTICE MIDWIFE

## 2023-12-28 PROCEDURE — 84270 ASSAY OF SEX HORMONE GLOBUL: CPT | Performed by: ADVANCED PRACTICE MIDWIFE

## 2023-12-28 PROCEDURE — 84146 ASSAY OF PROLACTIN: CPT | Performed by: ADVANCED PRACTICE MIDWIFE

## 2023-12-28 PROCEDURE — 36415 COLL VENOUS BLD VENIPUNCTURE: CPT | Performed by: ADVANCED PRACTICE MIDWIFE

## 2023-12-28 PROCEDURE — 84443 ASSAY THYROID STIM HORMONE: CPT | Performed by: ADVANCED PRACTICE MIDWIFE

## 2023-12-28 PROCEDURE — 82626 DEHYDROEPIANDROSTERONE: CPT | Mod: 90 | Performed by: ADVANCED PRACTICE MIDWIFE

## 2023-12-28 PROCEDURE — 82157 ASSAY OF ANDROSTENEDIONE: CPT | Mod: 90 | Performed by: ADVANCED PRACTICE MIDWIFE

## 2023-12-28 PROCEDURE — 99204 OFFICE O/P NEW MOD 45 MIN: CPT | Performed by: ADVANCED PRACTICE MIDWIFE

## 2023-12-28 PROCEDURE — 99000 SPECIMEN HANDLING OFFICE-LAB: CPT | Performed by: ADVANCED PRACTICE MIDWIFE

## 2023-12-28 RX ORDER — NORETHINDRONE ACETATE AND ETHINYL ESTRADIOL .03; 1.5 MG/1; MG/1
1 TABLET ORAL DAILY
Qty: 90 TABLET | Refills: 1 | Status: SHIPPED | OUTPATIENT
Start: 2023-12-28 | End: 2024-04-15

## 2023-12-28 RX ORDER — ONDANSETRON 4 MG/1
4 TABLET, ORALLY DISINTEGRATING ORAL EVERY 8 HOURS PRN
Qty: 30 TABLET | Refills: 0 | Status: SHIPPED | OUTPATIENT
Start: 2023-12-28

## 2023-12-28 ASSESSMENT — ANXIETY QUESTIONNAIRES
GAD7 TOTAL SCORE: 10
5. BEING SO RESTLESS THAT IT IS HARD TO SIT STILL: SEVERAL DAYS
1. FEELING NERVOUS, ANXIOUS, OR ON EDGE: MORE THAN HALF THE DAYS
3. WORRYING TOO MUCH ABOUT DIFFERENT THINGS: MORE THAN HALF THE DAYS
2. NOT BEING ABLE TO STOP OR CONTROL WORRYING: MORE THAN HALF THE DAYS
GAD7 TOTAL SCORE: 10
7. FEELING AFRAID AS IF SOMETHING AWFUL MIGHT HAPPEN: SEVERAL DAYS
IF YOU CHECKED OFF ANY PROBLEMS ON THIS QUESTIONNAIRE, HOW DIFFICULT HAVE THESE PROBLEMS MADE IT FOR YOU TO DO YOUR WORK, TAKE CARE OF THINGS AT HOME, OR GET ALONG WITH OTHER PEOPLE: VERY DIFFICULT
6. BECOMING EASILY ANNOYED OR IRRITABLE: SEVERAL DAYS

## 2023-12-28 ASSESSMENT — PATIENT HEALTH QUESTIONNAIRE - PHQ9
5. POOR APPETITE OR OVEREATING: SEVERAL DAYS
SUM OF ALL RESPONSES TO PHQ QUESTIONS 1-9: 6

## 2023-12-28 NOTE — PATIENT INSTRUCTIONS
Birth Control Pills    Combination birth control pills contain both estrogen and progestin.  There are numerous brands of birth control pills otherwise known as oral contraceptive pills (OCP's).  Each brand has a different combination of estrogen and progestin so every woman can find the one that is right for her.  OCP's are a safe and effective way to prevent pregnancy in most women.    How do OCP's work  OCP's work by several different mechanisms.  They cause changes in the cervix and the lining of the uterus.  The cervical mucus becomes thicker which will prevent the sperm from entering the cervix.  The lining of the uterus becomes thin which helps prevent an egg from attaching to it.  In combination, these events make it unlikely that you will get pregnant. It may also prevent ovulation completely.    Benefits of OCP's  May reduce your risk of:  Cancer of the uterus and ovary, ovarian cysts, pelvic infection, bone loss, benign breast disease, anemia, ectopic pregnancy and acne.  It may also decrease symptoms of PCOS (Polycystic Ovarian Syndrome). OCP's may also improve cramping during menstrual cycle and may make you cycle shorter and lighter.    How to take OCP's  You have several choices on how to start taking your OCP's:  You can start the pill on the first day of your next period  You can start the pill on the Sunday after your next period starts  You can start the pill on the first day it was prescribed no matter where you are in your cycle.  In this case, you will need to make sure you are not pregnant.    No matter when you start your first pack, you will always start your next pack on the same day you started your first pack.    You should take the pill at the same time every day.  Do not skip any pills.  If you miss any pills, are taking antibiotics or vomit, use a backup method of birth control until you get your next period.    Pills come in packs of 21, 28 or 91 pills:    21 Pills:  Take one pill at  the same time every day for 21 days.  Wait 7 days before beginning your next pack.  During these 7 days you will have your period.  28 Pills:  Take one pill at the same time every day for 28 days.  The last 7 pills in the pack do not contain estrogen/progestin.  During these 7 days you will have your period.    91 Pills:  Take one pill at the same time every day for 91 days.  The last 7 pills in the pack do not contain estrogen/progestin.  During these 7 days you will have your period.  With this method you will only have 4 periods a year.  Some women eventually have no bleeding at all.    Each pill pack comes with instructions.  Please make sure you read them and understand these instructions.      What to do if you miss a pill    Occasionally you may forget to take a pill or not take it on time.  Take the missed pill as soon as you remember.  Take the next pill at the regular time.  It is ok if you take two pills in one day.  You may feel a bit queasy or have some spotting, this is normal and should not be concerning.  If you have missed more than one pill use a back up method of birth control and call the clinic for instructions on how to proceed.    Who should not take Combined OCP's  If you have a history or have blood clots  A history of cerebral vascular accident (stroke)  If you have ischemic heart or coronary artery disease  Known of suspected breast cancer  Known or suspected pregnancy  Smoker and over age 35  Any know liver abnormality  Migraine headaches with an aura  Undiagnosed abnormal vaginal bleeding  High blood pressure    Common side effects when starting OCP's  Headache, nausea, dizziness, breakthrough bleeding, missed periods, tender breasts, depression and anxiety.  Most side effects are minor and resolve in the first few months. Take the pill with meals or at bedtime if nausea occurs.    Call or return for care in the following circumstances:    Unexpected missed periods or very heavy  bleeding  Persistent vaginal bleeding  Depression  Suspected pregnancy  Persistent side effects such as:  Nausea, irregular menses or mood changes.    Seek emergency care immediately for the following:  ACHES  Abdominal or pelvic pain  Chest pain  Severe headache   Visual disturbances  Severe leg pain or numbness or tingling of extremities    Lastly-  Use of a backup method is recommended for the first cycle  Condoms are recommended to protect against STI's  OCP's are 99% effective if take correctly.  The pill helps to keep your periods regular, lighter and shorter and reduces cramps.  If you desire a pregnancy, you may stop taking your OCPs.     Please call the clinic with questions and concerns  [facility]  [phone]

## 2023-12-28 NOTE — RESULT ENCOUNTER NOTE
Hgb 10.2, hx iron def anemia secondary to heavy menses. Pt should continue iron supplements. No change in plan of care, pt is to initiate COCs today and has follow-up with Dr. Young in January    LEIEL Reyes, ELSAM

## 2023-12-29 ENCOUNTER — PATIENT OUTREACH (OUTPATIENT)
Dept: CARE COORDINATION | Facility: CLINIC | Age: 28
End: 2023-12-29

## 2023-12-29 NOTE — PROGRESS NOTES
Clinic Care Coordination Contact  [facility]/Voicemail    Clinical Data: Care Coordinator Outreach    Outreach Documentation Number of Outreach Attempt   12/29/2023  11:59 AM 1       Left message on patient's voicemail with call back information and requested return call.    Plan: Care Coordinator will try to reach patient again in 1-2 business days.    NAE Lam (covering for Noemi Romo)  Clinic Care Coordination  [facility]:   [facility]  [phone]

## 2024-01-01 LAB
ANDROST SERPL-MCNC: 0.8 NG/ML
DHEA SERPL-MCNC: 2.7 NG/ML
TESTOST FREE SERPL-MCNC: 0.31 NG/DL
TESTOST SERPL-MCNC: 15 NG/DL (ref 8–60)

## 2024-01-02 ENCOUNTER — PATIENT OUTREACH (OUTPATIENT)
Dept: CARE COORDINATION | Facility: CLINIC | Age: 29
End: 2024-01-02

## 2024-01-02 NOTE — RESULT ENCOUNTER NOTE
Informed via mychart of normal labs with exception of SHBG, already notified of anemia, will continue iron. Has follow up scheduled in January with pelvic US.    ELIEL Rueda, ELSAM

## 2024-01-02 NOTE — LETTER
[facility] CARE COORDINATION      January 2, 2024    Chante Nair  [address] [address] [address]      Dear Chante,    I am a clinic community health worker who works with Physician Mayte Ref-Primary with the [facility]. I have been trying to reach you recently to introduce Clinic Care Coordination. Below is a description of clinic care coordination and how I can further assist you.       The clinic care coordination team is made up of a registered nurse, , financial resource worker and community health worker who understand the health care system. The goal of clinic care coordination is to help you manage your health and improve access to the health care system. Our team works alongside your provider to assist you in determining your health and social needs. We can help you obtain health care and community resources, providing you with necessary information and education. We can work with you through any barriers and develop a care plan that helps coordinate and strengthen the communication between you and your care team.  Our services are voluntary and are offered without charge to you personally.    Please feel free to contact me with any questions or concerns regarding care coordination and what we can offer.      We are focused on providing you with the highest-quality healthcare experience possible.    Sincerely,     NAE Land  Clinic Care Coordination  [facility]: [facility] [place], [facility]  Phone: [phone]

## 2024-01-02 NOTE — PROGRESS NOTES
Clinic Care Coordination Contact  [facility]/Voicemail    Clinical Data: Care Coordinator Outreach    Outreach Documentation Number of Outreach Attempt   12/29/2023  11:59 AM 1   1/2/2024   9:00 AM 2         Reason for Referral:  Financial Support  Insurance  Medication Affordability    Does not have health insurance            Left message on patient's voicemail with call back information and requested return call.    Plan: Care Coordinator will send care coordination introduction letter with care coordinator contact information and explanation of care coordination services via Outrightt.     Care Coordinator will do no further outreaches at this time.    NAE Land  Clinic Care Coordination  [facility]: [facility], [place], [place], and [facility]  Phone: [phone]

## 2024-01-23 ENCOUNTER — ANCILLARY PROCEDURE (OUTPATIENT)
Dept: ULTRASOUND IMAGING | Facility: CLINIC | Age: 29
End: 2024-01-23
Attending: ADVANCED PRACTICE MIDWIFE

## 2024-01-23 ENCOUNTER — OFFICE VISIT (OUTPATIENT)
Dept: OBGYN | Facility: CLINIC | Age: 29
End: 2024-01-23
Attending: ADVANCED PRACTICE MIDWIFE

## 2024-01-23 VITALS
DIASTOLIC BLOOD PRESSURE: 70 MMHG | SYSTOLIC BLOOD PRESSURE: 116 MMHG | BODY MASS INDEX: 35.16 KG/M2 | HEIGHT: 69 IN | WEIGHT: 237.4 LBS

## 2024-01-23 DIAGNOSIS — R19.00 PELVIC MASS: ICD-10-CM

## 2024-01-23 DIAGNOSIS — N93.9 ABNORMAL UTERINE BLEEDING (AUB): Primary | ICD-10-CM

## 2024-01-23 DIAGNOSIS — N92.1 MENORRHAGIA WITH IRREGULAR CYCLE: ICD-10-CM

## 2024-01-23 DIAGNOSIS — Z59.71 DOES NOT HAVE HEALTH INSURANCE: ICD-10-CM

## 2024-01-23 PROCEDURE — 76830 TRANSVAGINAL US NON-OB: CPT | Performed by: STUDENT IN AN ORGANIZED HEALTH CARE EDUCATION/TRAINING PROGRAM

## 2024-01-23 PROCEDURE — 99213 OFFICE O/P EST LOW 20 MIN: CPT | Performed by: STUDENT IN AN ORGANIZED HEALTH CARE EDUCATION/TRAINING PROGRAM

## 2024-01-23 NOTE — PROGRESS NOTES
"[facility]  OB/GYN Clinic Note    SUBJECTIVE:                                                   Chante Nair is a 28 year old  female who presents to clinic today for the following health issue(s):  Patient presents with:  Follow Up: US - Menorrhagia with irregular cycle        HPI:  Seen today for US follow-up for AUB. Has cycles 50 days long, which is not bothersome to her. Menses are less painful than before, so she is not bothered by lengthy menses. But then, came in because she had  an even longer menses, with blood clots. Was started on OCP which has stopped her menses. Just started placebo with no bleeding yet. Whole body itching since switching to placebo. Has never had pelvic exam. 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   Patient is not sexually active, .  Using oral contraceptives for contraception.    reports that she has never smoked. She has never used smokeless tobacco.  STD testing offered?  Declined  Health maintenance updated:  yes    Today's PHQ-2 Score:       2024     4:33 PM   PHQ-2 (  [employer])   Q1: Little interest or pleasure in doing things 1   Q2: Feeling down, depressed or hopeless 1   PHQ-2 Score 2   Q1: Little interest or pleasure in doing things Several days   Q2: Feeling down, depressed or hopeless Several days   PHQ-2 Score 2       OBJECTIVE:     /70   Ht 1.753 m (5' 9\")   Wt 107.7 kg (237 lb 6.4 oz)   LMP 2023   BMI 35.06 kg/m    Body mass index is 35.06 kg/m .    Exam:  Constitutional:  Appearance: Well nourished, well developed alert, in no acute distress     In-Clinic Test Results:  Results for orders placed or performed in visit on 24 (from the past 24 hour(s))   US Transvaginal Pelvic Non-OB    Narrative    Table formatting from the original result was not included.     Gynecological Ultrasound Report  Pelvic U/S " - Transvaginal  [facility]  Referring Provider: Treav Lowery CNM   Sonographer:  Brittnee Spurling, RDMS  Indication: Bleeding/Menses- Menorrhagia (heavy menses)  LMP: Patient's last menstrual period was 2024.  History: birth control  Gynecological Ultrasonography:   Uterus: anteverted. Contour is smooth/regular.  Size: 8.1 x 6.3 x 5.8 cm  Endometrium: Thickness Total 34.9 mm  Findings: thickened endometrium w/ blood flow  Right Ovary: 2.4 x 2.5 x 2.3 cm. Wnl  Left Ovary: 2.8 x 2.0 x 1.5 cm. Wnl  Cul de Sac Free Fluid: No free fluid  Technique: Transvaginal Imaging performed     Impression:   The were visualized and no abnormalities were seen.  The endometrium appeared markedly thickened and well circumscribed, cannot   rule out intracavitary fibroid. The uterine contour is smooth and regular.       Jaky Young MD, [facility]  24         ASSESSMENT/PLAN:                                                        ICD-10-CM    1. Abnormal uterine bleeding (AUB)  N93.9       2. Pelvic mass  R19.00       3. Does not have health insurance  Z59.89           Chante Nair is a 28 year old  here for follow-up of AUB.   US with possible fibroid vs thickened endometrium. Prior CT scan from  also reviewed. Thickened endometrium seen and possible adjacent mass? Unclear if this is uterine anomaly or fibroid, or normal finding. Has not had pelvic exam before.   Given possible abnormal finding on US today, recommend follow-up with MRI in the future, or sooner if experiencing AUB. Discussed option of proceeding directly with hysteroscopy, however thickening of endometrium may be due to late secretory phase of menstrual cycle. Can consider repeat US also.   If symptoms of AUB have fully resolved on OCP, discussed follow-up can be delayed until insurance is acquired. Discussed continuous OCP use if menses are heavy. Offered putting in referral again for care coordinator, however she  plans to call them herself.     Jaky Young MD, MHS  [facility] [place]  01/23/24

## 2024-01-24 ENCOUNTER — TELEPHONE (OUTPATIENT)
Dept: OBGYN | Facility: CLINIC | Age: 29
End: 2024-01-24

## 2024-01-24 NOTE — TELEPHONE ENCOUNTER
Pt reporting increased pelvic pain today  Pt understands that based on visit yesterday--if symptoms continue that either an MRI or hysteroscopy is recommended    Wondering if starting restarting her birth control today (non-placebo week) would help eliminate symptoms. Would it be ok to wait and see how she does or if it would be better for her to move forward with the testing.    Still passing clots.  Using tylenol for pain  Still working on insurance, pt reiterated that she will work on that independently and care coordination is not needed at this time.    Pt understands decisions may also be based on how much her symptoms are impacting her day to day life and when she obtains her insurance.  Pt just looking for more provider guidance    1/23/24:  Impression:   The were visualized and no abnormalities were seen.  The endometrium appeared markedly thickened and well circumscribed, cannot rule out intracavitary fibroid. The uterine contour is smooth and regular.   Jaky Young MD, S  01/23/24     Given possible abnormal finding on US today, recommend follow-up with MRI in the future, or sooner if experiencing AUB. Discussed option of proceeding directly with hysteroscopy, however thickening of endometrium may be due to late secretory phase of menstrual cycle. Can consider repeat US also.   If symptoms of AUB have fully resolved on OCP, discussed follow-up can be delayed until insurance is acquired. Discussed continuous OCP use if menses are heavy. Offered putting in referral again for care coordinator, however she plans to call them herself.     Routing pt Aireon message to provider to advise.  Patt Schwartz RN on 1/24/2024 at 12:31 PM

## 2024-01-24 NOTE — TELEPHONE ENCOUNTER
Reason for call:  Symptom   Symptom or request: Pt was in yesterday and  had an U/S & saw . Her pelvic pain is worse today. She is wondering if a Nurse can call her back today     Duration (how long have symptoms been present):   Have you been treated for this before? Yes    Additional comments:none    Phone number to reach patient:  Cell number on file:    Telephone Information:   Mobile [phone]       Best Time: Today please    Can we leave a detailed message on this number?  Not Applicable    Travel screening: Not Applicable

## 2024-01-25 NOTE — TELEPHONE ENCOUNTER
She can skip placebo and start new pack now if desired. This may not work to resolve symptoms, but is worth it to try.

## 2024-04-15 DIAGNOSIS — N92.1 MENORRHAGIA WITH IRREGULAR CYCLE: ICD-10-CM

## 2024-04-15 NOTE — TELEPHONE ENCOUNTER
Requested Prescriptions   Pending Prescriptions Disp Refills    norethindrone-ethinyl estradiol (MICROGESTIN 1.5/30) 1.5-30 MG-MCG tablet 90 tablet 1     Sig: Take 1 tablet by mouth daily       There is no refill protocol information for this order        Last Written Prescription Date:  12/28/23  Last Fill Quantity: 90,  # refills: 1   Last office visit: 12/28/2023 ; last virtual visit: Visit date not found with prescribing provider:  Hannah   [facility] Office Visit:  none found    Prescription approved per [facility] Refill Protocol.    Leidy Thomas RN on 4/16/2024 at 9:21 AM

## 2024-04-16 RX ORDER — NORETHINDRONE ACETATE AND ETHINYL ESTRADIOL .03; 1.5 MG/1; MG/1
1 TABLET ORAL DAILY
Qty: 90 TABLET | Refills: 1 | Status: SHIPPED | OUTPATIENT
Start: 2024-04-16

## 2024-06-16 ENCOUNTER — HEALTH MAINTENANCE LETTER (OUTPATIENT)
Age: 29
End: 2024-06-16

## 2024-10-14 DIAGNOSIS — N92.1 MENORRHAGIA WITH IRREGULAR CYCLE: ICD-10-CM

## 2024-10-14 RX ORDER — NORETHINDRONE ACETATE AND ETHINYL ESTRADIOL .03; 1.5 MG/1; MG/1
1 TABLET ORAL DAILY
Qty: 90 TABLET | Refills: 0 | Status: SHIPPED | OUTPATIENT
Start: 2024-10-14

## 2024-10-14 NOTE — TELEPHONE ENCOUNTER
Requested Prescriptions   Pending Prescriptions Disp Refills    norethindrone-ethinyl estradiol (MICROGESTIN 1.5/30) 1.5-30 MG-MCG tablet 90 tablet 1     Sig: Take 1 tablet by mouth daily.       Contraceptives Protocol Passed - 10/14/2024  8:33 AM        Passed - Patient is not a current smoker if age is 35 or older        Passed - Recent (12 mo) or future (30 days) visit within the authorizing provider's specialty     The patient must have completed an in-person or virtual visit within the past 12 months or has a future visit scheduled within the next 90 days with the authorizing provider s specialty.  Urgent care and e-visits do not quality as an office visit for this protocol.          Passed - Medication is active on med list        Passed - Medication indicated for associated diagnosis     Medication is associated with one or more of the following diagnoses:  Contraception  Acne  Dysmenorrhea  Menorrhagia  Amenorrhea  PCOS  Premenstrual Dysphoric Disorder  Irregular menses  Endometriosis          Passed - No active pregnancy on record        Passed - No positive pregnancy test in past 12 months           Last Written Prescription Date:  4/16/24  Last Fill Quantity: 90,  # refills: 1   Last office visit: 12/28/2023 ; last virtual visit: Visit date not found with prescribing provider:  Treva Chaudhry   Future Office Visit:        Prescription approved per [facility] Refill Protocol.  Patt Schwartz RN on 10/14/2024 at 2:33 PM   MARSHALL CASANOVA

## 2025-06-21 ENCOUNTER — HEALTH MAINTENANCE LETTER (OUTPATIENT)
Age: 30
End: 2025-06-21

## 2025-07-22 ENCOUNTER — HOSPITAL ENCOUNTER (EMERGENCY)
Facility: CLINIC | Age: 30
Discharge: HOME | End: 2025-07-22
Attending: EMERGENCY MEDICINE

## 2025-07-22 ENCOUNTER — APPOINTMENT (OUTPATIENT)
Dept: GENERAL RADIOLOGY | Facility: CLINIC | Age: 30
End: 2025-07-22
Attending: EMERGENCY MEDICINE

## 2025-07-22 VITALS
HEART RATE: 95 BPM | OXYGEN SATURATION: 100 % | TEMPERATURE: 98 F | SYSTOLIC BLOOD PRESSURE: 143 MMHG | RESPIRATION RATE: 16 BRPM | DIASTOLIC BLOOD PRESSURE: 100 MMHG

## 2025-07-22 DIAGNOSIS — M54.42 ACUTE MIDLINE LOW BACK PAIN WITH BILATERAL SCIATICA: ICD-10-CM

## 2025-07-22 DIAGNOSIS — M54.41 ACUTE MIDLINE LOW BACK PAIN WITH BILATERAL SCIATICA: ICD-10-CM

## 2025-07-22 DIAGNOSIS — S39.012A LOW BACK STRAIN, INITIAL ENCOUNTER: ICD-10-CM

## 2025-07-22 PROCEDURE — 99284 EMERGENCY DEPT VISIT MOD MDM: CPT | Performed by: EMERGENCY MEDICINE

## 2025-07-22 PROCEDURE — 250N000013 HC RX MED GY IP 250 OP 250 PS 637: Performed by: EMERGENCY MEDICINE

## 2025-07-22 PROCEDURE — 72100 X-RAY EXAM L-S SPINE 2/3 VWS: CPT

## 2025-07-22 RX ORDER — OXYCODONE HYDROCHLORIDE 5 MG/1
5 TABLET ORAL EVERY 6 HOURS PRN
Qty: 10 TABLET | Refills: 0 | Status: SHIPPED | OUTPATIENT
Start: 2025-07-22 | End: 2025-07-25

## 2025-07-22 RX ORDER — ORPHENADRINE CITRATE 100 MG/1
100 TABLET ORAL ONCE
Status: COMPLETED | OUTPATIENT
Start: 2025-07-22 | End: 2025-07-22

## 2025-07-22 RX ORDER — PREDNISONE 20 MG/1
TABLET ORAL
Qty: 10 TABLET | Refills: 0 | Status: SHIPPED | OUTPATIENT
Start: 2025-07-22

## 2025-07-22 RX ORDER — OXYCODONE HYDROCHLORIDE 5 MG/1
5 TABLET ORAL ONCE
Refills: 0 | Status: COMPLETED | OUTPATIENT
Start: 2025-07-22 | End: 2025-07-22

## 2025-07-22 RX ORDER — ACETAMINOPHEN 325 MG/1
975 TABLET ORAL ONCE
Status: COMPLETED | OUTPATIENT
Start: 2025-07-22 | End: 2025-07-22

## 2025-07-22 RX ADMIN — ORPHENADRINE CITRATE 100 MG: 100 TABLET, EXTENDED RELEASE ORAL at 11:58

## 2025-07-22 RX ADMIN — ACETAMINOPHEN 975 MG: 325 TABLET ORAL at 13:37

## 2025-07-22 RX ADMIN — OXYCODONE HYDROCHLORIDE 5 MG: 5 TABLET ORAL at 11:14

## 2025-07-22 ASSESSMENT — ACTIVITIES OF DAILY LIVING (ADL)
ADLS_ACUITY_SCORE: 41

## 2025-07-22 ASSESSMENT — COLUMBIA-SUICIDE SEVERITY RATING SCALE - C-SSRS
6. HAVE YOU EVER DONE ANYTHING, STARTED TO DO ANYTHING, OR PREPARED TO DO ANYTHING TO END YOUR LIFE?: NO
2. HAVE YOU ACTUALLY HAD ANY THOUGHTS OF KILLING YOURSELF IN THE PAST MONTH?: NO
1. IN THE PAST MONTH, HAVE YOU WISHED YOU WERE DEAD OR WISHED YOU COULD GO TO SLEEP AND NOT WAKE UP?: NO

## 2025-07-22 NOTE — ED TRIAGE NOTES
Patient presents to ED via EMS. Patient reports back pain after helping a friend move something heavy. 15 IV Toradol given by EMS. ABCs intact

## 2025-07-22 NOTE — ED PROVIDER NOTES
Emergency Department Note      Consent was obtained from the patient to use an AI documentation tool in the creation of this note.    Code Status: No Order    History of Present Illness     Chief Complaint:  Back Pain       HPI   History of Present Illness-Chante Nair, 30-year-old female, reports acute onset of lower back pain that began earlier today while assisting a friend with moving activities. She states that she was squatting down to place cardboard in a burn pit and felt fine at that time. Upon standing up and attempting to walk up a slight hill to retrieve a tree, she experienced sudden lower back pain radiating down both legs, which made it difficult to walk. She estimates the pain started after approximately 20 steps. She describes the pain as primarily in the lower back, with some radiation to the legs, but currently both legs feel fine. She notes some mild numbness sensations in her bilateral arms, but attributes it to her sitting position. She denies any history of similar back pain except for a previous episode related to a kidney stone, which she describes as a different, dull pain not radiating down the legs. She denies any urinary symptoms or hematuria at this time. She did not take any medications, including Tylenol or Motrin, prior to EMS arrival. Paramedics administered 15 IV Toradol en route, which she reports did not provide relief. She does not take any regular medications and denies use of diabetic medications, blood thinners, or asthma medicines. She has not previously injured, twisted, or sprained her back in a similar manner.    Independent Historian:    None    Review of External Notes  None    Past Medical History   Medical History, Surgical History, Problem List, and Medications  Reviewed in Epic    Physical Exam   Patient Vitals for the past 24 hrs:   BP Temp Temp src Pulse Resp SpO2   07/22/25 1341 (!) 143/100 -- -- 95 16 100 %   07/22/25 1048 (!) 151/117 98  F (36.7  C) Temporal  87 16 100 %       Physical Exam  Physical Exam  Constitutional: Vital signs reviewed as above.  Eyes: PEERL, EOMI B/L  Neck: No JVD noted. FROM  Cardiovascular: normal rate, regular rhythm and normal heart sounds.  Pulmonary/Chest: Effort normal and breath sounds normal. No respiratory distress. Patient has no wheezes. Patient has no rales.  Gastrointestinal: Soft. There is no tenderness.  Musculoskeletal/Extremities: No deformities noted. No pitting edema noted. Midline lumbar spine tenderness and paraspinal muscular tenderness noted.  Skin: Skin is warm and dry. There is no diaphoresis noted.  Psychiatric: The patient appears calm.  Neurological: Negative straight leg raise test bilaterally. Dorsiflexion of both feet and extension at the knee elicit low back pain radiating to the proximal thigh.    Diagnostics       Laboratory: Imaging:   Labs Ordered and Resulted from Time of ED Arrival to Time of ED Departure - No data to display  Lumbar spine XR, 2-3 views   Final Result   IMPRESSION: Rudimentary ribs on T12 again noted. 5 lumbar type vertebrae. Normal alignment. Vertebral body heights normal. No fractures. Facet arthropathy at L4-L5 and L5-S1. No other significant degenerative change.            EKG   None    Independent Interpretation  See ED course    ED Course    Medications Administered  Medications   orphenadrine ER (NORFLEX) 12 hr tablet 100 mg (100 mg Oral $Given 7/22/25 1158)   oxyCODONE (ROXICODONE) tablet 5 mg (5 mg Oral $Given 7/22/25 1114)   acetaminophen (TYLENOL) tablet 975 mg (975 mg Oral $Given 7/22/25 1337)       Procedures  Procedures     Discussion of Management  See ED Course    ED Course  ED Course as of 07/22/25 1511   Tue Jul 22, 2025   1050 I obtained history and examined the patient as noted above.    1311 Rechecked and updated.       Optional/Additional Documentation: None    Medical Decision Making / Diagnosis     MIPS     None    Medical Decision Making:  This 30-year-old presents  due to back pain.  Please see the HPI and exam for specifics.  A broad differential was considered including occult fracture especially given the lack of trauma but localized midline tenderness.  It is certainly possible the patient could have a lumbar radiculopathy due to herniated intervertebral disc though there were no red flags that prompt me to order MRI imaging.    The patient noted some improvement in symptoms with medications and noted that she was able to stand for the x-rays.  She states that that actually felt a little better than sitting on the chair and notes that laying down also feels better.    She does not feel that this is in any way similar to her prior kidney stones and denies any urinary symptoms.  Laboratory studies were not felt to be needed at this time.    Ultimately, I felt that discharged to home with a prescription for medications as below is reasonable.  Primary care follow-up for consideration of physical therapy is also reasonable.  She can always return with new or worsening symptoms.  Anticipatory guidance given prior to discharge.      Critical Care:  None.    Disposition:  See ED Course and MDM    ICD-10 Codes:    ICD-10-CM    1. Acute midline low back pain with bilateral sciatica  M54.42     M54.41       2. Low back strain, initial encounter  S39.012A            Discharge Medications:  Discharge Medication List as of 7/22/2025  1:37 PM        START taking these medications    Details   oxyCODONE (ROXICODONE) 5 MG tablet Take 1 tablet (5 mg) by mouth every 6 hours as needed., Disp-10 tablet, R-0, E-Prescribe      predniSONE (DELTASONE) 20 MG tablet Take two tablets (= 40mg) each day for 5 (five) days, Disp-10 tablet, R-0, E-Prescribe            Scribe Disclosure:  I, Cali Garcia, am serving as a scribe at 10:51 AM on 7/22/2025 to document services personally performed by Juve Underwood DO based on my observations and the provider's statements to me.       7/22/2025    Juve Underwood DO     [employer]         Juve Underwood DO  07/22/25 [id number]

## 2025-08-04 ENCOUNTER — PATIENT OUTREACH (OUTPATIENT)
Dept: CARE COORDINATION | Facility: CLINIC | Age: 30
End: 2025-08-04

## 2025-08-04 DIAGNOSIS — Z71.89 OTHER SPECIFIED COUNSELING: Primary | Chronic | ICD-10-CM

## 2025-08-08 ENCOUNTER — PATIENT OUTREACH (OUTPATIENT)
Dept: CARE COORDINATION | Facility: CLINIC | Age: 30
End: 2025-08-08

## 2025-08-20 ENCOUNTER — PATIENT OUTREACH (OUTPATIENT)
Dept: CARE COORDINATION | Facility: CLINIC | Age: 30
End: 2025-08-20